# Patient Record
Sex: FEMALE | Race: WHITE | Employment: OTHER | URBAN - METROPOLITAN AREA
[De-identification: names, ages, dates, MRNs, and addresses within clinical notes are randomized per-mention and may not be internally consistent; named-entity substitution may affect disease eponyms.]

---

## 2018-09-11 ENCOUNTER — HOSPITAL ENCOUNTER (INPATIENT)
Facility: HOSPITAL | Age: 83
LOS: 2 days | Discharge: NURSING, REHAB OR LONG-TERM CARE | DRG: 292 | End: 2018-09-14
Attending: EMERGENCY MEDICINE | Admitting: HOSPITALIST
Payer: MEDICARE

## 2018-09-11 ENCOUNTER — APPOINTMENT (OUTPATIENT)
Dept: CT IMAGING | Facility: HOSPITAL | Age: 83
DRG: 292 | End: 2018-09-11
Attending: EMERGENCY MEDICINE
Payer: MEDICARE

## 2018-09-11 ENCOUNTER — APPOINTMENT (OUTPATIENT)
Dept: GENERAL RADIOLOGY | Facility: HOSPITAL | Age: 83
DRG: 292 | End: 2018-09-11
Attending: EMERGENCY MEDICINE
Payer: MEDICARE

## 2018-09-11 DIAGNOSIS — R06.02 SHORTNESS OF BREATH: Primary | ICD-10-CM

## 2018-09-11 DIAGNOSIS — R10.9 ABDOMINAL PAIN OF UNKNOWN ETIOLOGY: ICD-10-CM

## 2018-09-11 PROBLEM — R79.89 AZOTEMIA: Status: ACTIVE | Noted: 2018-09-11

## 2018-09-11 PROBLEM — D64.9 ANEMIA: Status: ACTIVE | Noted: 2018-09-11

## 2018-09-11 PROBLEM — E87.3 METABOLIC ALKALOSIS: Status: ACTIVE | Noted: 2018-09-11

## 2018-09-11 LAB
ALBUMIN SERPL-MCNC: 3.1 G/DL (ref 3.5–4.8)
ALBUMIN/GLOB SERPL: 1.1 {RATIO} (ref 1–2)
ALP LIVER SERPL-CCNC: 70 U/L (ref 55–142)
ALT SERPL-CCNC: 52 U/L (ref 14–54)
ANION GAP SERPL CALC-SCNC: 3 MMOL/L (ref 0–18)
AST SERPL-CCNC: 32 U/L (ref 15–41)
ATRIAL RATE: 84 BPM
BASOPHILS # BLD AUTO: 0.03 X10(3) UL (ref 0–0.1)
BASOPHILS NFR BLD AUTO: 0.3 %
BILIRUB SERPL-MCNC: 0.3 MG/DL (ref 0.1–2)
BILIRUB UR QL STRIP.AUTO: NEGATIVE
BUN BLD-MCNC: 31 MG/DL (ref 8–20)
BUN/CREAT SERPL: 46.3 (ref 10–20)
CALCIUM BLD-MCNC: 8.8 MG/DL (ref 8.3–10.3)
CHLORIDE SERPL-SCNC: 104 MMOL/L (ref 101–111)
CLARITY UR REFRACT.AUTO: CLEAR
CO2 SERPL-SCNC: 36 MMOL/L (ref 22–32)
CREAT BLD-MCNC: 0.67 MG/DL (ref 0.55–1.02)
EOSINOPHIL # BLD AUTO: 0.07 X10(3) UL (ref 0–0.3)
EOSINOPHIL NFR BLD AUTO: 0.8 %
ERYTHROCYTE [DISTWIDTH] IN BLOOD BY AUTOMATED COUNT: 17 % (ref 11.5–16)
GLOBULIN PLAS-MCNC: 2.7 G/DL (ref 2.5–4)
GLUCOSE BLD-MCNC: 126 MG/DL (ref 70–99)
GLUCOSE BLD-MCNC: 139 MG/DL (ref 65–99)
GLUCOSE BLD-MCNC: 148 MG/DL (ref 65–99)
GLUCOSE UR STRIP.AUTO-MCNC: NEGATIVE MG/DL
GRAN CASTS #/AREA URNS LPF: PRESENT /LPF
HCT VFR BLD AUTO: 32.2 % (ref 34–50)
HGB BLD-MCNC: 9.6 G/DL (ref 12–16)
HYALINE CASTS #/AREA URNS AUTO: PRESENT /LPF
IMMATURE GRANULOCYTE COUNT: 0.04 X10(3) UL (ref 0–1)
IMMATURE GRANULOCYTE RATIO %: 0.4 %
KETONES UR STRIP.AUTO-MCNC: NEGATIVE MG/DL
LEUKOCYTE ESTERASE UR QL STRIP.AUTO: NEGATIVE
LIPASE: 130 U/L (ref 73–393)
LYMPHOCYTES # BLD AUTO: 0.63 X10(3) UL (ref 0.9–4)
LYMPHOCYTES NFR BLD AUTO: 6.8 %
M PROTEIN MFR SERPL ELPH: 5.8 G/DL (ref 6.1–8.3)
MCH RBC QN AUTO: 27.9 PG (ref 27–33.2)
MCHC RBC AUTO-ENTMCNC: 29.8 G/DL (ref 31–37)
MCV RBC AUTO: 93.6 FL (ref 81–100)
MONOCYTES # BLD AUTO: 0.73 X10(3) UL (ref 0.1–1)
MONOCYTES NFR BLD AUTO: 7.9 %
NEUTROPHIL ABS PRELIM: 7.77 X10 (3) UL (ref 1.3–6.7)
NEUTROPHILS # BLD AUTO: 7.77 X10(3) UL (ref 1.3–6.7)
NEUTROPHILS NFR BLD AUTO: 83.8 %
NITRITE UR QL STRIP.AUTO: NEGATIVE
OSMOLALITY SERPL CALC.SUM OF ELEC: 304 MOSM/KG (ref 275–295)
P AXIS: 35 DEGREES
P-R INTERVAL: 142 MS
PH UR STRIP.AUTO: 5 [PH] (ref 4.5–8)
PLATELET # BLD AUTO: 302 10(3)UL (ref 150–450)
POTASSIUM SERPL-SCNC: 4.6 MMOL/L (ref 3.6–5.1)
PRO-BETA NATRIURETIC PEPTIDE: ABNORMAL PG/ML (ref ?–450)
PROT UR STRIP.AUTO-MCNC: 100 MG/DL
Q-T INTERVAL: 340 MS
QRS DURATION: 80 MS
QTC CALCULATION (BEZET): 401 MS
R AXIS: 48 DEGREES
RBC # BLD AUTO: 3.44 X10(6)UL (ref 3.8–5.1)
RBC UR QL AUTO: NEGATIVE
RED CELL DISTRIBUTION WIDTH-SD: 57.6 FL (ref 35.1–46.3)
SODIUM SERPL-SCNC: 143 MMOL/L (ref 136–144)
SP GR UR STRIP.AUTO: 1.03 (ref 1–1.03)
T AXIS: 15 DEGREES
TROPONIN I SERPL-MCNC: <0.046 NG/ML (ref ?–0.05)
UROBILINOGEN UR STRIP.AUTO-MCNC: <2 MG/DL
VENTRICULAR RATE: 84 BPM
WBC # BLD AUTO: 9.3 X10(3) UL (ref 4–13)

## 2018-09-11 PROCEDURE — 74177 CT ABD & PELVIS W/CONTRAST: CPT | Performed by: EMERGENCY MEDICINE

## 2018-09-11 PROCEDURE — 71045 X-RAY EXAM CHEST 1 VIEW: CPT | Performed by: EMERGENCY MEDICINE

## 2018-09-11 PROCEDURE — 99223 1ST HOSP IP/OBS HIGH 75: CPT | Performed by: HOSPITALIST

## 2018-09-11 RX ORDER — ATORVASTATIN CALCIUM 10 MG/1
10 TABLET, FILM COATED ORAL NIGHTLY
COMMUNITY

## 2018-09-11 RX ORDER — UBIDECARENONE 30 MG
1 CAPSULE ORAL DAILY
COMMUNITY

## 2018-09-11 RX ORDER — MIRTAZAPINE 15 MG/1
15 TABLET, FILM COATED ORAL NIGHTLY
Status: DISCONTINUED | OUTPATIENT
Start: 2018-09-11 | End: 2018-09-14

## 2018-09-11 RX ORDER — ESCITALOPRAM OXALATE 20 MG/1
20 TABLET ORAL DAILY
COMMUNITY

## 2018-09-11 RX ORDER — ONDANSETRON 2 MG/ML
4 INJECTION INTRAMUSCULAR; INTRAVENOUS ONCE
Status: COMPLETED | OUTPATIENT
Start: 2018-09-11 | End: 2018-09-13

## 2018-09-11 RX ORDER — CALCIUM CARBONATE 200(500)MG
2 TABLET,CHEWABLE ORAL EVERY 6 HOURS PRN
COMMUNITY

## 2018-09-11 RX ORDER — CLOTRIMAZOLE AND BETAMETHASONE DIPROPIONATE 10; .64 MG/G; MG/G
CREAM TOPICAL 2 TIMES DAILY
COMMUNITY
End: 2018-09-11

## 2018-09-11 RX ORDER — SENNA PLUS 8.6 MG/1
1 TABLET ORAL DAILY PRN
COMMUNITY

## 2018-09-11 RX ORDER — ECHINACEA PURPUREA EXTRACT 125 MG
1 TABLET ORAL AS NEEDED
COMMUNITY
End: 2018-09-11 | Stop reason: CLARIF

## 2018-09-11 RX ORDER — MAGNESIUM HYDROXIDE/ALUMINUM HYDROXICE/SIMETHICONE 120; 1200; 1200 MG/30ML; MG/30ML; MG/30ML
30 SUSPENSION ORAL 4 TIMES DAILY PRN
COMMUNITY

## 2018-09-11 RX ORDER — CARBOXYMETHYLCELLULOSE SODIUM 5 MG/ML
1 SOLUTION/ DROPS OPHTHALMIC 3 TIMES DAILY PRN
COMMUNITY

## 2018-09-11 RX ORDER — MIRTAZAPINE 15 MG/1
15 TABLET, FILM COATED ORAL NIGHTLY
COMMUNITY

## 2018-09-11 RX ORDER — VIT A/VIT C/VIT E/ZINC/COPPER 7160-113
1 TABLET, DELAYED RELEASE (ENTERIC COATED) ORAL DAILY
COMMUNITY

## 2018-09-11 RX ORDER — LOSARTAN POTASSIUM 100 MG/1
100 TABLET ORAL DAILY
Status: ON HOLD | COMMUNITY
End: 2018-09-14

## 2018-09-11 RX ORDER — TRAMADOL HYDROCHLORIDE 50 MG/1
50 TABLET ORAL EVERY 12 HOURS PRN
Status: ON HOLD | COMMUNITY
End: 2018-09-14

## 2018-09-11 RX ORDER — CELECOXIB 200 MG/1
200 CAPSULE ORAL DAILY
COMMUNITY

## 2018-09-11 RX ORDER — ACETAMINOPHEN 325 MG/1
650 TABLET ORAL EVERY 6 HOURS PRN
COMMUNITY

## 2018-09-11 RX ORDER — HYDROMORPHONE HYDROCHLORIDE 1 MG/ML
0.5 INJECTION, SOLUTION INTRAMUSCULAR; INTRAVENOUS; SUBCUTANEOUS EVERY 30 MIN PRN
Status: CANCELLED | OUTPATIENT
Start: 2018-09-11 | End: 2018-09-11

## 2018-09-11 RX ORDER — BETAMETHASONE DIPROPIONATE 0.5 MG/G
LOTION TOPICAL DAILY PRN
COMMUNITY

## 2018-09-11 RX ORDER — MELATONIN
3 NIGHTLY
COMMUNITY

## 2018-09-11 RX ORDER — FUROSEMIDE 10 MG/ML
40 INJECTION INTRAMUSCULAR; INTRAVENOUS ONCE
Status: COMPLETED | OUTPATIENT
Start: 2018-09-11 | End: 2018-09-11

## 2018-09-11 RX ORDER — ACETAMINOPHEN 325 MG/1
650 TABLET ORAL EVERY 6 HOURS PRN
Status: DISCONTINUED | OUTPATIENT
Start: 2018-09-11 | End: 2018-09-14

## 2018-09-11 RX ORDER — IPRATROPIUM BROMIDE AND ALBUTEROL SULFATE 2.5; .5 MG/3ML; MG/3ML
3 SOLUTION RESPIRATORY (INHALATION) EVERY 4 HOURS PRN
Status: DISCONTINUED | OUTPATIENT
Start: 2018-09-11 | End: 2018-09-14

## 2018-09-11 RX ORDER — ALBUTEROL SULFATE 90 UG/1
1 AEROSOL, METERED RESPIRATORY (INHALATION) EVERY 6 HOURS PRN
COMMUNITY

## 2018-09-11 RX ORDER — FUROSEMIDE 10 MG/ML
20 INJECTION INTRAMUSCULAR; INTRAVENOUS
Status: DISCONTINUED | OUTPATIENT
Start: 2018-09-12 | End: 2018-09-12

## 2018-09-11 RX ORDER — DEXTROSE MONOHYDRATE 25 G/50ML
50 INJECTION, SOLUTION INTRAVENOUS
Status: DISCONTINUED | OUTPATIENT
Start: 2018-09-11 | End: 2018-09-14

## 2018-09-11 RX ORDER — ENOXAPARIN SODIUM 100 MG/ML
40 INJECTION SUBCUTANEOUS NIGHTLY
Status: DISCONTINUED | OUTPATIENT
Start: 2018-09-11 | End: 2018-09-14

## 2018-09-11 RX ORDER — ONDANSETRON 2 MG/ML
INJECTION INTRAMUSCULAR; INTRAVENOUS
Status: DISPENSED
Start: 2018-09-11 | End: 2018-09-12

## 2018-09-11 RX ORDER — ONDANSETRON 2 MG/ML
4 INJECTION INTRAMUSCULAR; INTRAVENOUS EVERY 4 HOURS PRN
Status: CANCELLED | OUTPATIENT
Start: 2018-09-11

## 2018-09-11 RX ORDER — METOPROLOL TARTRATE 50 MG/1
75 TABLET, FILM COATED ORAL 2 TIMES DAILY
Status: ON HOLD | COMMUNITY
End: 2018-09-14

## 2018-09-11 RX ORDER — MELATONIN
3 NIGHTLY
Status: DISCONTINUED | OUTPATIENT
Start: 2018-09-11 | End: 2018-09-14

## 2018-09-11 NOTE — ED INITIAL ASSESSMENT (HPI)
Shortness of breath, nausea, abdominal pain x1 day. No vomiting. Afebrile. Hx COPD, CHF, HTN, DM2.  afib noted on EKG in ambulance. Lives at [facility].

## 2018-09-11 NOTE — ED PROVIDER NOTES
Patient Seen in: [facility] Emergency Department    History   Patient presents with:  Nausea/Vomiting/Diarrhea (gastrointestinal)  Dyspnea URBAN SOB (respiratory)    Stated Complaint: nausea, vomiting, SOB    HPI    Patient is a 80-year-old female who reactive to light and accommodation. Mouth normal, neck supple, no meningismus. LUNGS: Lungs diminished bilaterally with crackles at the bases bilaterally. CARDIOVASCULAR: + S1-S2, regular rate and rhythm, no murmurs.   BACK: No CVA tenderness, no midlin Abnormal            Final result                 Please view results for these tests on the individual orders.    TROPONIN I   PRO BETA NATRIURETIC PEPTIDE   LIPASE   RAINBOW DRAW BLUE   RAINBOW DRAW LAVENDER   RAINBOW DRAW LIGHT GREEN   RAINBOW DRAW G

## 2018-09-12 ENCOUNTER — APPOINTMENT (OUTPATIENT)
Dept: CV DIAGNOSTICS | Facility: HOSPITAL | Age: 83
DRG: 292 | End: 2018-09-12
Attending: HOSPITALIST
Payer: MEDICARE

## 2018-09-12 LAB
ADENOVIRUS PCR:: NEGATIVE
B PERT DNA SPEC QL NAA+PROBE: NEGATIVE
C PNEUM DNA SPEC QL NAA+PROBE: NEGATIVE
CORONAVIRUS 229E PCR:: NEGATIVE
CORONAVIRUS HKU1 PCR:: NEGATIVE
CORONAVIRUS NL63 PCR:: NEGATIVE
CORONAVIRUS OC43 PCR:: NEGATIVE
EST. AVERAGE GLUCOSE BLD GHB EST-MCNC: 114 MG/DL (ref 68–126)
FLUAV RNA SPEC QL NAA+PROBE: NEGATIVE
FLUBV RNA SPEC QL NAA+PROBE: NEGATIVE
GLUCOSE BLD-MCNC: 127 MG/DL (ref 65–99)
GLUCOSE BLD-MCNC: 141 MG/DL (ref 65–99)
GLUCOSE BLD-MCNC: 154 MG/DL (ref 65–99)
GLUCOSE BLD-MCNC: 158 MG/DL (ref 65–99)
HBA1C MFR BLD HPLC: 5.6 % (ref ?–5.7)
METAPNEUMOVIRUS PCR:: NEGATIVE
MYCOPLASMA PNEUMONIA PCR:: NEGATIVE
PARAINFLUENZA 1 PCR:: NEGATIVE
PARAINFLUENZA 2 PCR:: NEGATIVE
PARAINFLUENZA 3 PCR:: NEGATIVE
PARAINFLUENZA 4 PCR:: NEGATIVE
RHINOVIRUS/ENTERO PCR:: NEGATIVE
RSV RNA SPEC QL NAA+PROBE: NEGATIVE

## 2018-09-12 PROCEDURE — 93306 TTE W/DOPPLER COMPLETE: CPT | Performed by: HOSPITALIST

## 2018-09-12 PROCEDURE — 99232 SBSQ HOSP IP/OBS MODERATE 35: CPT | Performed by: HOSPITALIST

## 2018-09-12 RX ORDER — FUROSEMIDE 10 MG/ML
40 INJECTION INTRAMUSCULAR; INTRAVENOUS
Status: DISCONTINUED | OUTPATIENT
Start: 2018-09-12 | End: 2018-09-14

## 2018-09-12 NOTE — CONSULTS
[facility]  Cardiology Consultation    Cristopher Rendon Patient Status:  Inpatient    1934 MRN [id number]   [facility] 5NW-A Attending Mayela Andre, [address] Day # 0 PCP Gadiel Serna MD     Reason for Consultation:  CHF Osteoarthritis    • Oxygen dependent      No past surgical history on file. No family history on file. reports that she does not drink alcohol or use drugs.     Allergies:    Doxycycline             UNKNOWN    Medications:    Current Facility-Administere 9/12/2018 1657  Last data filed at 9/12/2018 1352  Gross per 24 hour   Intake —   Output 800 ml   Net -800 ml     Wt Readings from Last 3 Encounters:  09/11/18 : 117 lb (53.1 kg)      Physical Exam:   General: Alert and oriented x 3. No apparent distress. leaflets.  There was moderate regurgitation. 7. Pulmonary arteries: Systolic pressure was markedly increased, in the range of 60mm Hg to 70mm Hg. Estimated pulmonary artery diastolic pressure was 70RI Hg.   8. Pericardium, extracardiac: A small pericardial imaging and procedures  -if she has truly deferred further aggressive treatment of her valvular heart disease than she would be appropriate for consideration of palliative care consultation      Thank you for allowing me to participate in the care of your

## 2018-09-12 NOTE — PROGRESS NOTES
[facility]  Progress Note     Nilsa Hatchet Patient Status:  Observation    1934 MRN [id number]   [facility] 5NW-A Attending Freda Marc,    Hosp Day # 0 PCP Craig Bethea MD     Chief Complaint: SOB     S: Patient seen a ASSESSMENT / PLAN:     1. Presumed acute on chronic diastolic heart failure  1. IV diuresis  2. Echo  3. Cardiology consulted  2. Mod-large B/L pleural effusions  1. Suspect secondary to #1  2. Monitor response to diuresis with repeat CXR in AM  3.  Dolores Aponte

## 2018-09-12 NOTE — PHYSICAL THERAPY NOTE
PHYSICAL THERAPY QUICK EVALUATION - INPATIENT    Room Number: 524/524-A  Evaluation Date: 9/12/2018  Presenting Problem: Shortness of breath nausea abdominal pain for 2 days  Physician Order: PT Eval and Treat  Pt is 80year old female admitted on 9/11/2 SITUATION  Type of Home: [address] [place] [address] Street: One level  Stairs to Enter : 0                Drives: No  Patient Owned Equipment: Rolling walker       Prior Level of [place]: Pt reports needed help with dressing and bathing, needs a Modifier (G-Code): CJ      FUNCTIONAL ABILITY STATUS  Gait Assessment  Gait Assistance: Not tested                   Skilled Therapy Provided: Pt received supine in bed and agreeable to PT.  Pt demo multiple inconsistencies in both subjective and objective in therapy, inconsistence in presentation and ability to perform bed mobility and transfers IND pt is being dc from IP PT.   PT Discharge Recommendations: Long term care([facility])    PLAN  Patient currently does not meet criteria for skilled inpatient ph

## 2018-09-12 NOTE — PROGRESS NOTES
NURSING ADMISSION NOTE      Patient admitted via Cart  Oriented to room. Safety precautions initiated. Bed in low position. Call light in reach. At this time pt is very drowsy and unable to provide any information.  Spoke with Marine Chinchilla RN from [facility]

## 2018-09-12 NOTE — H&P
[facility]  History and Physical     Samule Dust Patient Status:  Observation    1934 MRN [id number]   [facility] 5NW-A Attending Chele Pascual MD   Hosp Day # 0 PCP Raven Coe MD     Chief Complaint: Shortness of breat Aerosol Powder, Breath Activated Inhale 1 puff into the lungs daily. Disp:  Rfl:    Calcium Carbonate Antacid (MARIZA-GEST ANTACID) 500 MG Oral Chew Tab Chew 2 tablets by mouth every 6 (six) hours as needed.    Disp:  Rfl:    celecoxib 200 MG Oral Cap Take 200 Nasal Solution 2 sprays by Nasal route daily as needed for congestion. Disp:  Rfl:    Polyethyl Glycol-Propyl Glycol (SYSTANE) 0.4-0.3 % Ophthalmic Solution Place 1 drop into both eyes as needed.  Disp:  Rfl:        Review of Systems:   A comprehensive 14 p steroids nebulizers and oxygen as needed  2. Acute diastolic CHF  -Continue Lasix started in the emergency room and monitor urine output as well as kidney function  3. History of mild cognitive impairment stable  4.   Diabetes type 2 fair control on hyper

## 2018-09-12 NOTE — PROGRESS NOTES
Multidisciplinary Discharge Rounds held 9/12/2018.     Treatment team members present today include , , Charge Nurse,  Nurse, RT, PT and Pharmacy caring for [employer].     Other care providers present:    Patient Active Proble

## 2018-09-13 ENCOUNTER — APPOINTMENT (OUTPATIENT)
Dept: GENERAL RADIOLOGY | Facility: HOSPITAL | Age: 83
DRG: 292 | End: 2018-09-13
Attending: HOSPITALIST
Payer: MEDICARE

## 2018-09-13 PROBLEM — I50.9 CHF (CONGESTIVE HEART FAILURE) (HCC): Status: ACTIVE | Noted: 2018-09-13

## 2018-09-13 LAB
ANION GAP SERPL CALC-SCNC: 2 MMOL/L (ref 0–18)
BASOPHILS # BLD AUTO: 0.05 X10(3) UL (ref 0–0.1)
BASOPHILS NFR BLD AUTO: 0.7 %
BUN BLD-MCNC: 27 MG/DL (ref 8–20)
BUN/CREAT SERPL: 39.1 (ref 10–20)
CALCIUM BLD-MCNC: 8.7 MG/DL (ref 8.3–10.3)
CHLORIDE SERPL-SCNC: 99 MMOL/L (ref 101–111)
CO2 SERPL-SCNC: 44 MMOL/L (ref 22–32)
CREAT BLD-MCNC: 0.69 MG/DL (ref 0.55–1.02)
EOSINOPHIL # BLD AUTO: 0.09 X10(3) UL (ref 0–0.3)
EOSINOPHIL NFR BLD AUTO: 1.2 %
ERYTHROCYTE [DISTWIDTH] IN BLOOD BY AUTOMATED COUNT: 17.2 % (ref 11.5–16)
GLUCOSE BLD-MCNC: 112 MG/DL (ref 70–99)
GLUCOSE BLD-MCNC: 113 MG/DL (ref 65–99)
GLUCOSE BLD-MCNC: 117 MG/DL (ref 65–99)
GLUCOSE BLD-MCNC: 144 MG/DL (ref 65–99)
GLUCOSE BLD-MCNC: 186 MG/DL (ref 65–99)
HCT VFR BLD AUTO: 33 % (ref 34–50)
HGB BLD-MCNC: 9.7 G/DL (ref 12–16)
IMMATURE GRANULOCYTE COUNT: 0.02 X10(3) UL (ref 0–1)
IMMATURE GRANULOCYTE RATIO %: 0.3 %
LYMPHOCYTES # BLD AUTO: 0.7 X10(3) UL (ref 0.9–4)
LYMPHOCYTES NFR BLD AUTO: 9.7 %
MCH RBC QN AUTO: 27.4 PG (ref 27–33.2)
MCHC RBC AUTO-ENTMCNC: 29.4 G/DL (ref 31–37)
MCV RBC AUTO: 93.2 FL (ref 81–100)
MONOCYTES # BLD AUTO: 0.67 X10(3) UL (ref 0.1–1)
MONOCYTES NFR BLD AUTO: 9.2 %
NEUTROPHIL ABS PRELIM: 5.72 X10 (3) UL (ref 1.3–6.7)
NEUTROPHILS # BLD AUTO: 5.72 X10(3) UL (ref 1.3–6.7)
NEUTROPHILS NFR BLD AUTO: 78.9 %
OSMOLALITY SERPL CALC.SUM OF ELEC: 306 MOSM/KG (ref 275–295)
PLATELET # BLD AUTO: 282 10(3)UL (ref 150–450)
POTASSIUM SERPL-SCNC: 4 MMOL/L (ref 3.6–5.1)
RBC # BLD AUTO: 3.54 X10(6)UL (ref 3.8–5.1)
RED CELL DISTRIBUTION WIDTH-SD: 58.7 FL (ref 35.1–46.3)
SODIUM SERPL-SCNC: 145 MMOL/L (ref 136–144)
WBC # BLD AUTO: 7.3 X10(3) UL (ref 4–13)

## 2018-09-13 PROCEDURE — 99232 SBSQ HOSP IP/OBS MODERATE 35: CPT | Performed by: HOSPITALIST

## 2018-09-13 PROCEDURE — 71046 X-RAY EXAM CHEST 2 VIEWS: CPT | Performed by: HOSPITALIST

## 2018-09-13 RX ORDER — ONDANSETRON 2 MG/ML
4 INJECTION INTRAMUSCULAR; INTRAVENOUS EVERY 6 HOURS PRN
Status: DISCONTINUED | OUTPATIENT
Start: 2018-09-13 | End: 2018-09-14

## 2018-09-13 RX ORDER — DOCUSATE SODIUM 100 MG/1
100 CAPSULE, LIQUID FILLED ORAL 2 TIMES DAILY PRN
Status: DISCONTINUED | OUTPATIENT
Start: 2018-09-13 | End: 2018-09-14

## 2018-09-13 NOTE — PROGRESS NOTES
[facility]  Progress Note     Blease Fire Patient Status:  Observation    1934 MRN [id number]   [facility] 5NW-A Attending Allen Smith, [address] [facility] Day # 1 PCP Daniela Oneill MD     Chief Complaint: SOB     S: Patient seen a • enoxaparin  40 mg Subcutaneous Nightly   • Insulin Aspart Pen  1-5 Units Subcutaneous TID CC and HS       ASSESSMENT / PLAN:     1. Acute on chronic diastolic heart failure secondary to valvular heart disease   1. Improving with IV diuresis  2.  Echo re

## 2018-09-13 NOTE — PROGRESS NOTES
[facility]  Cardiology Progress Note    Subjective:  No chest pain or shortness of breath. Eating her lunch. Feels ok.     Objective:  /67 (BP Location: Left arm)   Pulse 85   Temp 97.5 °F (36.4 °C) (Oral)   Resp 18   Ht 5' 2\" (1.575 m)   Wt 1 information.  - Continue BID Lasix. Diuril 250mg IV once today.   - Favor transfer to [place] when bed available for CHF management until further delineation of aggressive care versus palliation can be confirmed/discussed based on review of records

## 2018-09-13 NOTE — CM/SW NOTE
Met with pt who is an 81 y/o woman admitted for shortness of breath. Pt is a resident of the [facility] facility, although she has only been there for about 3 weeks. Pt confirmed that she would like to return there at [place].   She is never  with n

## 2018-09-13 NOTE — CM/SW NOTE
Received call from pt's sister confirming plan for pt to return to [facility] at [facility]. / to remain available for support and/or discharge planning.      [facility]   W:[phone]   Q:[phone]

## 2018-09-13 NOTE — PROGRESS NOTES
Saw pt this afternoon - she told me she does not want aggressive care/  Surgery. But is feeling better. She wanted me to speak w/ her sister Anaid Osorio about her wishes. She has a POSLT form from Aug stating she wanted full code status- signed at [facility].  I

## 2018-09-14 VITALS
DIASTOLIC BLOOD PRESSURE: 37 MMHG | HEIGHT: 62 IN | OXYGEN SATURATION: 97 % | RESPIRATION RATE: 17 BRPM | HEART RATE: 75 BPM | WEIGHT: 107.13 LBS | BODY MASS INDEX: 19.71 KG/M2 | TEMPERATURE: 98 F | SYSTOLIC BLOOD PRESSURE: 96 MMHG

## 2018-09-14 PROBLEM — Z51.5 PALLIATIVE CARE ENCOUNTER: Status: ACTIVE | Noted: 2018-09-14

## 2018-09-14 PROBLEM — Z71.89 GOALS OF CARE, COUNSELING/DISCUSSION: Status: ACTIVE | Noted: 2018-09-14

## 2018-09-14 LAB
ANION GAP SERPL CALC-SCNC: 5 MMOL/L (ref 0–18)
BUN BLD-MCNC: 29 MG/DL (ref 8–20)
BUN/CREAT SERPL: 29.3 (ref 10–20)
CALCIUM BLD-MCNC: 9 MG/DL (ref 8.3–10.3)
CHLORIDE SERPL-SCNC: 93 MMOL/L (ref 101–111)
CO2 SERPL-SCNC: 45 MMOL/L (ref 22–32)
CREAT BLD-MCNC: 0.99 MG/DL (ref 0.55–1.02)
DEPRECATED HBV CORE AB SER IA-ACNC: 52.1 NG/ML (ref 18–340)
GLUCOSE BLD-MCNC: 119 MG/DL (ref 65–99)
GLUCOSE BLD-MCNC: 121 MG/DL (ref 65–99)
GLUCOSE BLD-MCNC: 164 MG/DL (ref 70–99)
GLUCOSE BLD-MCNC: 196 MG/DL (ref 65–99)
IRON SATURATION: 8 % (ref 15–50)
IRON: 30 UG/DL (ref 28–170)
OSMOLALITY SERPL CALC.SUM OF ELEC: 305 MOSM/KG (ref 275–295)
POTASSIUM SERPL-SCNC: 4.2 MMOL/L (ref 3.6–5.1)
SODIUM SERPL-SCNC: 143 MMOL/L (ref 136–144)
TOTAL IRON BINDING CAPACITY: 378 UG/DL (ref 240–450)
TRANSFERRIN SERPL-MCNC: 254 MG/DL (ref 200–360)

## 2018-09-14 PROCEDURE — 99239 HOSP IP/OBS DSCHRG MGMT >30: CPT | Performed by: HOSPITALIST

## 2018-09-14 PROCEDURE — 99222 1ST HOSP IP/OBS MODERATE 55: CPT | Performed by: CLINICAL NURSE SPECIALIST

## 2018-09-14 RX ORDER — LOSARTAN POTASSIUM 25 MG/1
25 TABLET ORAL DAILY
Qty: 30 TABLET | Refills: 5 | Status: SHIPPED | OUTPATIENT
Start: 2018-09-14

## 2018-09-14 RX ORDER — FUROSEMIDE 40 MG/1
40 TABLET ORAL 2 TIMES DAILY
Qty: 30 TABLET | Refills: 2 | Status: SHIPPED | OUTPATIENT
Start: 2018-09-14

## 2018-09-14 RX ORDER — METOPROLOL SUCCINATE 50 MG/1
75 TABLET, EXTENDED RELEASE ORAL 2 TIMES DAILY
Qty: 45 TABLET | Refills: 2 | Status: SHIPPED | OUTPATIENT
Start: 2018-09-14

## 2018-09-14 NOTE — CONSULTS
[facility]  [id number]  Hospital Day #2  Date of Consult: 09/14/18       Reason for Consultation: Consult requested for evaluation of palliative care needs and goals of care discussion. correctly answer all orientation questions posed to her. With patient's permission, contacted her sister/HCPOA via phone due to documented concerned regarding patient's wishes for care.   Sister/Penny states she feels patient is completely able to make h

## 2018-09-14 NOTE — PROGRESS NOTES
09/14/18 1612   Clinical Encounter Type   Visited With Health care provider   Routine Visit Follow-up  ( responded to page request to provide POLST for patient prior to discharge.   explained to staff how to access previously scanned, exe

## 2018-09-14 NOTE — PROGRESS NOTES
[facility]  Cardiology Progress Note    Milana Cardenas Patient Status:  Inpatient    1934 MRN [id number]   [facility] 5NW-A Attending Isadora Farris, DO   Hosp Day # 2 PCP Heather Arias MD     Subjective:  Patient states that her dependent  · Hypertension  · Hyperlipidemia  · Anemia  · Paroxysmal Atrial fibrillation, rates controlled. Was in sinus on sept 11, but afib from sept 12 onward. Patient not on anticoagulation on admission and none started since admission. Plan:  1.

## 2018-09-14 NOTE — PROGRESS NOTES
[facility]  Progress Note     Jemal Hoit Patient Status:  Observation    1934 MRN [id number]   [facility] 5NW-A Attending Neil Montilla, [address] [facility] Day # 2 PCP Celia Sotomayor MD     Chief Complaint: SOB     S: Patient seen a 40 mg Subcutaneous Nightly   • Insulin Aspart Pen  1-5 Units Subcutaneous TID CC and HS       ASSESSMENT / PLAN:     1. Acute on chronic diastolic heart failure secondary to valvular heart disease   1. Improved with diuresis   2. Echo reviewed   3.  Cardiol

## 2018-09-14 NOTE — PLAN OF CARE
Discussed atrial fibrillation with the patient. She is unsure if she has ever been diagnosed with this before and is unsure if she was ever on a blood thinner.  [facility] records does not have atrial fibrillation as a diagnoses on record and we are still a

## 2018-09-14 NOTE — PROGRESS NOTES
[facility]  Progress Note     Shyanne Severe Patient Status:  Observation    1934 MRN [id number]   [facility] 5NW-A Attending June Yin, [address] [facility] Day # 2 PCP Óscar Mckenna MD     Chief Complaint: SOB     S: Patient seen a Daily   • melatonin  3 mg Oral Nightly   • Metoprolol Tartrate  75 mg Oral 2x Daily(Beta Blocker)   • mirtazapine  15 mg Oral Nightly   • enoxaparin  40 mg Subcutaneous Nightly   • Insulin Aspart Pen  1-5 Units Subcutaneous TID CC and HS       ASSESSMENT /

## 2018-09-14 NOTE — PROGRESS NOTES
NURSING DISCHARGE NOTE    Discharged Nursing home via Ambulance. Accompanied by Support staff  Belongings Taken by patient/family. Patient discharged with 2L oxygen to [facility].   [id number] Eileen Moran for discharge per cardiology and hospitalist. Nataly Castellanos

## 2018-09-14 NOTE — PROGRESS NOTES
09/14/18 1439   Clinical Encounter Type   Visited With Health care provider    filed POLST form into patient's electronic medical record. Original copy to be given to patient.  to remain available at pager [phone].

## 2018-09-14 NOTE — CM/SW NOTE
Per ERROL Samaniego, patient can d/c to [facility] today. Copy of new POLST form will return with patient. LM with Otilia Rodríguez to inform of POLST form and to continue PC at [facility].  [facility] ok to receive patient after 5 pm. Ambulance scheduled to

## 2018-09-14 NOTE — CONSULTS
Heart Failure Navigator Progress Note    Patient was evaluated by the Heart Failure Coordinator for understanding, verbalization, demonstration, and recall of education related to heart failure, overall adherence to the behaviors neces

## 2018-09-14 NOTE — DIETARY NOTE
Nutrition Short Note    Dietitian consult received per heart failure standing order. Reviewed and provided handouts on meal planning for those with CHF.  Discussed sodium and fluid guidelines, foods to avoid, seasoning without salt, food labels and eating o

## 2018-09-15 NOTE — DISCHARGE SUMMARY
[facility] [place] [facility]PITAL[facility]  DISCHARGE SUMMARY     Inés Paniagua Patient Status:  Inpatient    1934 MRN [id number]   [facility] 5NW-A Attending No att. providers found   Hosp Day # 2 PCP Ashley Monsalve MD     Date of Admission: 2018  Date states that she has been having nausea but no vomiting she denies diarrhea or constipation and she is also complaining of shortness of breath sudden onset no fever chills chest pain or cough associated with lower extremities edema and worse with exertion. appraised of her situation and agrees with plans and holding of anticoagulation per her sister's request.  Will need follow-up with PCP    Procedures during hospitalization:   • Echocardiogram   1. Left ventricle:  The cavity size was normal. Wall thickness lowering of Cozaar  • Home O2 at 2 L  • Follow-up with PCP  · Normal iron and ferritin levels  · Start twice daily Lasix  · Will need BMP    Lab/Test results pending at Discharge:   · None    Consultants:  • Cardiology, social work dietary, palliative care mg by mouth daily. Refills:  0     Cranberry 600 MG Tabs      Take 600 mg by mouth daily. Refills:  0     escitalopram 20 MG Tabs  Commonly known as:  LEXAPRO      Take 20 mg by mouth daily.    Refills:  0     glycerin (laxative) 1.2 g Supp      Place 1 [id number]  [phone]    In 2 weeks      Bisi Luevano MD  [address]  [facility] [address]  [facility]  [phone]    In 1 week  f/u       Vital signs:  Temp:  [97.6 °F (36.4 °C)-98.1 °F (36.7 °C)] 98.1 °F (36.7 °C)  Pulse:  [] 75  Resp:  [17-18

## 2018-09-17 NOTE — CM/SW NOTE
09/17/18 1000   Discharge disposition   Expected discharge disposition Skilled Nurs   Name of Facillity/Home Care/Hospice [facility]   Discharge transportation [facility]     Patient discharged on 09/14/2018 as previously planned.

## 2021-03-12 DIAGNOSIS — Z23 NEED FOR VACCINATION: ICD-10-CM

## (undated) NOTE — IP AVS SNAPSHOT
Patient Demographics     Address  [address] [address] Phone  [phone] [facility])      Emergency Contact(s)     Name Relation Home Work [facility] [phone]      Vicky Leal [phone] 3. Limit your fluid intake to no more than 2 liters or 64 ounces per day    4. Some exercise and activity is important to help keep your heart functioning and strong.  Unless instructed not to exercise, you may walk at a slow to moderate pace for 10-15 svetlana Commonly known as:  TYLENOL      Take 650 mg by mouth every 6 (six) hours as needed for Pain.     [    ]   [    ]   [    ]   [    ]     Albuterol Sulfate  (90 Base) MCG/ACT Aers      Inhale 1 puff into the lungs every 6 (six) hours as needed for [facility] Take 3 mg by mouth nightly. [    ]   [    ]   [    ]   [    ]     Metoprolol Succinate ER 50 MG Tb24  Commonly known as: Toprol XL      Take 1.5 tablets (75 mg total) by mouth 2 (two) times daily.    Franky Reese MD   [    ]   [    ]   [    ] [id number] furosemide (LASIX) injection 40 mg 09/13/18 1803 Given      [id number] furosemide (LASIX) injection 40 mg 09/14/18 0845 Given      [id number] melatonin tab 3 mg 09/13/18 2035 Given      [id number] metoprolol Tartrate (LOPRESSOR) tab 75 mg 09/13/18 % Saturation 8 15 - 50 %  Esme Malcolm            FERRITIN [[id number]] (Normal)  Resulted: 09/14/18 1437, Result status: Final result   Ordering provider:  Gabrielle Mckenzie MD  09/14/18 1406 Resulting lab:  [facility]    Specimen Information    Type Source Creatinine 0.99 0.55 - 1.02 mg/dL — [facility]   BUN/CREA Ratio 29.3 10.0 - 20.0 [facility]   Calcium, Total 9.0 8.3 - 10.3 mg/dL [facility]   Calculated Osmolality 305 275 - 295 mOsm/kg [facility]   GFR, Non-African American 52 >=60  [facility] Location [address] [place] [address] 5NW-A Attending Laina Boone MD   Hosp Day # 0 PCP Arminda Barrios MD     Chief Complaint: Shortness of breath nausea abdominal pain for 2 days    History of Present Illness: Vesta Minor is a 80year old female with history of A. Calcium Carbonate Antacid (MARIZA-GEST ANTACID) 500 MG Oral Chew Tab Chew 2 tablets by mouth every 6 (six) hours as needed. Disp:  Rfl:    celecoxib 200 MG Oral Cap Take 200 mg by mouth daily.    Disp:  Rfl:    Cranberry 600 MG Oral Tab Take 600 mg by mouth Polyethyl Glycol-Propyl Glycol (SYSTANE) 0.4-0.3 % Ophthalmic Solution Place 1 drop into both eyes as needed. Disp:  Rfl:        Review of Systems:   A comprehensive 14 point review of systems was completed.     Pertinent positives and negatives noted in th -Continue Lasix started in the emergency room and monitor urine output as well as kidney function  3. History of mild cognitive impairment stable  4. Diabetes type 2 fair control on hyperglycemia protocol  5. Hypertension stable  6.   Continue home oxyge intake. She has followed in the past with cardiologist at [facility] and from what I can gather she has known mitral valve prolapse and has been told to obtain surgical evaluation but has deferred.   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 Neurologic: Alert and oriented, normal affect. Skin: Warm and dry.      Laboratory Data:  Lab Results   Comp CXR[AA.1] 9/1[AA.2]1[AA.1]/2018[AA.2]: CONCLUSION:    1. Moderate to large bilateral pleural effusions. 2.  Associated lung consolidation may be any combination of passive atelectasis and or pneumonia.   3.  Cardiomegaly with pulmonary vascular redistribu Attribution Cerrato    AA. 1 - Iván Mccormick MD on 9/12/2018  4:57 PM  AA. 2 - Iván Mccormick MD on 9/12/2018  5:04 PM                     D/C Summary     No notes of this type exist for this encounter.       Imaging Results (HF patients)    Chest X-Ray Results (HF pat 02/16/1934 Ht:  (62in)  BP: 122 / 65 MRN:  [id number]    Age:  84years    Wt:  (117lb) HR: 90bpm Loc:  EDW        Gndr: F          BSA: 1.52m^2 Sonographer: Aleisha Li RDCS Ordering:    Marylin Haines  Referring:   Marylin Haines ---------------------------------- Right atrium: The atrium was moderately dilated. 6. Tricuspid valve: Structurally normal valve. Normal thickness leaflets. There was moderate regurgitation.  7. Pulmonary arteries: Systolic pressure was markedly increased, in the    range of 60mm Hg to 7 was normal.  Doppler:  Transvalvular velocity was within the normal range. There was no evidence for stenosis. There was moderate regurgitation. Pulmonic valve:   Poorly visualized. Doppler:  Transvalvular velocity was within the normal range.  There was n volume/bsa, ES, 1-p A4C                      120   ml/m^2 ---------  LA volume, ES, 1-p A2C                  (H)     175   ml     22 - 52  LA volume/bsa, ES, 1-p A2C                      115   ml/m^2 ---------   Pulmonary arteries :  Sudhir Sneed (PT Student) Snow Mora PT at 9/12/2018  4:02 PM         PHYSICAL THERAPY QUICK EVALUATION - INPATIENT    Room Number: 524/524-A  Evaluation Date: 9/12/2018[AR.1]  Presenting Problem: Shortness of breath nausea • Nonrheumatic mitral (valve) insufficiency    • Osteoarthritis    • Oxygen dependent[AR.2]        Past Surgical History[AR.1]  No past surgical history on file. [AR.2]    HOME SITUATION[AR.1]  Type of Home: Skilled nursing facility   Home Layout: One level -   Moving to and from a bed to a chair (including a wheelchair)?: None   -   Need to walk in hospital room?: A Little   -   Climbing 3-5 steps with a railing?: A Lot[AR.2]       AM-PAC Score:[AR.1]  Raw Score: 21   PT Approx Degree of Impairment Score: 28 '6-Clicks' Inpatient Basic Mobility Short Form was completed and this patient is demonstrating a 28.97% degree of impairment in mobility. Research supports that patients with this level of impairment may benefit from Melly 1737.   Based on this evaluation, anoop

## (undated) NOTE — IP AVS SNAPSHOT
[address]            (For Outpatient Use Only) Initial Admit Date: 9/11/2018   Inpt/Obs Admit Date: Inpt: 9/12/18 / Obs: 09/11/18   Discharge Date:    Hospital Acct:  [de-identified]   MRN: [de-identified]   CSN: [id number]        LEVY CHANDRA Subscriber Name:  Vicki Masters :    Subscriber ID:  Pt Rel to Subscriber:    Hospital Account Financial Class: Medicare    2018